# Patient Record
Sex: MALE | Race: WHITE | NOT HISPANIC OR LATINO | Employment: STUDENT | URBAN - METROPOLITAN AREA
[De-identification: names, ages, dates, MRNs, and addresses within clinical notes are randomized per-mention and may not be internally consistent; named-entity substitution may affect disease eponyms.]

---

## 2024-06-18 ENCOUNTER — OFFICE VISIT (OUTPATIENT)
Dept: FAMILY MEDICINE CLINIC | Facility: CLINIC | Age: 24
End: 2024-06-18
Payer: COMMERCIAL

## 2024-06-18 VITALS
WEIGHT: 124 LBS | HEIGHT: 68 IN | BODY MASS INDEX: 18.79 KG/M2 | SYSTOLIC BLOOD PRESSURE: 108 MMHG | HEART RATE: 104 BPM | OXYGEN SATURATION: 97 % | TEMPERATURE: 98.4 F | DIASTOLIC BLOOD PRESSURE: 82 MMHG | RESPIRATION RATE: 16 BRPM

## 2024-06-18 DIAGNOSIS — Z00.00 ENCOUNTER FOR MEDICAL EXAMINATION TO ESTABLISH CARE: Primary | ICD-10-CM

## 2024-06-18 DIAGNOSIS — F90.9 HYPERACTIVE: ICD-10-CM

## 2024-06-18 DIAGNOSIS — F41.9 ANXIETY: ICD-10-CM

## 2024-06-18 DIAGNOSIS — F32.A DEPRESSION, UNSPECIFIED DEPRESSION TYPE: ICD-10-CM

## 2024-06-18 DIAGNOSIS — Z63.0: ICD-10-CM

## 2024-06-18 PROCEDURE — 99204 OFFICE O/P NEW MOD 45 MIN: CPT | Performed by: STUDENT IN AN ORGANIZED HEALTH CARE EDUCATION/TRAINING PROGRAM

## 2024-06-18 RX ORDER — FLUOXETINE HYDROCHLORIDE 40 MG/1
1 CAPSULE ORAL DAILY
COMMUNITY
Start: 2024-03-27 | End: 2024-06-18 | Stop reason: SDUPTHER

## 2024-06-18 RX ORDER — FLUOXETINE HYDROCHLORIDE 40 MG/1
40 CAPSULE ORAL DAILY
Qty: 90 CAPSULE | Refills: 1 | Status: SHIPPED | OUTPATIENT
Start: 2024-06-18

## 2024-06-18 SDOH — SOCIAL STABILITY - SOCIAL INSECURITY: PROBLEMS IN RELATIONSHIP WITH SPOUSE OR PARTNER: Z63.0

## 2024-06-19 LAB
ALBUMIN SERPL-MCNC: 4.8 G/DL (ref 4.3–5.2)
ALP SERPL-CCNC: 68 IU/L (ref 44–121)
ALT SERPL-CCNC: 9 IU/L (ref 0–44)
AST SERPL-CCNC: 19 IU/L (ref 0–40)
BILIRUB SERPL-MCNC: 1.4 MG/DL (ref 0–1.2)
BUN SERPL-MCNC: 7 MG/DL (ref 6–20)
BUN/CREAT SERPL: 8 (ref 9–20)
CALCIUM SERPL-MCNC: 9.7 MG/DL (ref 8.7–10.2)
CHLORIDE SERPL-SCNC: 102 MMOL/L (ref 96–106)
CO2 SERPL-SCNC: 26 MMOL/L (ref 20–29)
CREAT SERPL-MCNC: 0.93 MG/DL (ref 0.76–1.27)
EGFRCR SERPLBLD CKD-EPI 2021: 118 ML/MIN/1.73
GLOBULIN SER CALC-MCNC: 2.4 G/DL (ref 1.5–4.5)
GLUCOSE SERPL-MCNC: 98 MG/DL (ref 70–99)
POTASSIUM SERPL-SCNC: 4.2 MMOL/L (ref 3.5–5.2)
PROT SERPL-MCNC: 7.2 G/DL (ref 6–8.5)
SODIUM SERPL-SCNC: 140 MMOL/L (ref 134–144)
TSH SERPL DL<=0.005 MIU/L-ACNC: 1.3 UIU/ML (ref 0.45–4.5)

## 2024-06-22 NOTE — PROGRESS NOTES
"Chief Complaint  Establish Care, Anxiety, Depression, and Med Refill (PROZAC 40 MG.)    Subjective        Iraj Mcdermott presents to [facility]  Anxiety  His past medical history is significant for depression.   Depression  His past medical history is significant for depression.     Establishing medical care  PMH anxiety and depression and is on prozac, would need refill today.  Also noticing inattentiveness and not able to finish his activities at home, currently staying at home looking for job. Would like to get evaluated for ADHD    Objective   Vital Signs:  /82 (BP Location: Left arm, Patient Position: Sitting, Cuff Size: Adult)   Pulse 104   Temp 98.4 °F (36.9 °C) (Oral)   Resp 16   Ht 172.7 cm (68\")   Wt 56.2 kg (124 lb)   SpO2 97%   BMI 18.85 kg/m²   Estimated body mass index is 18.85 kg/m² as calculated from the following:    Height as of this encounter: 172.7 cm (68\").    Weight as of this encounter: 56.2 kg (124 lb).       BMI is within normal parameters. No other follow-up for BMI required.      Physical Exam  Cardiovascular:      Rate and Rhythm: Normal rate.      Pulses: Normal pulses.      Heart sounds: Normal heart sounds.   Pulmonary:      Effort: Pulmonary effort is normal.      Breath sounds: Normal breath sounds.   Abdominal:      General: Bowel sounds are normal.      Palpations: Abdomen is soft.   Musculoskeletal:         General: Normal range of motion.   Skin:     General: Skin is warm.   Neurological:      Mental Status: He is alert and oriented to person, place, and time.        Result Review :    The following data was reviewed by: Gloria Callejas MD on 06/18/2024:  CMP          6/18/2024    15:34   CMP   Glucose 98    BUN 7    Creatinine 0.93    Sodium 140    Potassium 4.2    Chloride 102    Calcium 9.7    Total Protein 7.2    Albumin 4.8    Globulin 2.4    Total Bilirubin 1.4    Alkaline Phosphatase 68    AST (SGOT) 19    ALT (SGPT) 9  "   BUN/Creatinine Ratio 8      TSH          6/18/2024    15:34   TSH   TSH 1.300                   Assessment and Plan     Diagnoses and all orders for this visit:    1. Encounter for medical examination to establish care (Primary)    2. Hyperactive  -     NeuroPsych Testing    3. Has inattentive partner  -     NeuroPsych Testing    4. Depression, unspecified depression type  -     Comprehensive Metabolic Panel  -     TSH  -     FLUoxetine (PROzac) 40 MG capsule; Take 1 capsule by mouth Daily.  Dispense: 90 capsule; Refill: 1    5. Anxiety  -     Comprehensive Metabolic Panel  -     TSH  -     FLUoxetine (PROzac) 40 MG capsule; Take 1 capsule by mouth Daily.  Dispense: 90 capsule; Refill: 1    # for anxiety /depression  Prozac prescription refilled today  Pt will need neuropsych evaluation for possible ADHD  TSH CMP done WNL  RTC in 6 months for physical with labs         Follow Up     No follow-ups on file.  Patient was given instructions and counseling regarding his condition or for health maintenance advice. Please see specific information pulled into the AVS if appropriate.

## 2024-06-24 ENCOUNTER — TELEPHONE (OUTPATIENT)
Dept: FAMILY MEDICINE CLINIC | Facility: CLINIC | Age: 24
End: 2024-06-24
Payer: COMMERCIAL

## 2024-06-24 NOTE — TELEPHONE ENCOUNTER
PT SAID HE WAS TOLD TO CALL YOU BACK. [phone]    I DID NOT SEE ANY NOTES IN CHART FROM YOU REGARDING REASON.

## 2024-06-25 NOTE — TELEPHONE ENCOUNTER
CALLED PT BACK AND LEFT A VM FOR PT TO CALL ME. I CAN'T LEAVE A DETAILED MESSAGE ON HIS VM BECAUSE WE DID NOT GET A VERBAL RELEASE FORM SIGNED BY PT TO DO SO. I NEED PT TO COME BY OFFICE AND SIGN A VERBAL RELEASE AND I ALSO NEED PT'S INS CARD IN ORDER TO GET HIM SET UP FOR NEUROPSYCH TESTING.

## 2024-08-12 ENCOUNTER — OFFICE VISIT (OUTPATIENT)
Dept: FAMILY MEDICINE CLINIC | Facility: CLINIC | Age: 24
End: 2024-08-12
Payer: COMMERCIAL

## 2024-08-12 VITALS
OXYGEN SATURATION: 97 % | RESPIRATION RATE: 16 BRPM | WEIGHT: 124.4 LBS | HEART RATE: 64 BPM | SYSTOLIC BLOOD PRESSURE: 118 MMHG | DIASTOLIC BLOOD PRESSURE: 74 MMHG | HEIGHT: 68 IN | BODY MASS INDEX: 18.85 KG/M2 | TEMPERATURE: 98.5 F

## 2024-08-12 DIAGNOSIS — H66.001 NON-RECURRENT ACUTE SUPPURATIVE OTITIS MEDIA OF RIGHT EAR WITHOUT SPONTANEOUS RUPTURE OF TYMPANIC MEMBRANE: ICD-10-CM

## 2024-08-12 DIAGNOSIS — J02.9 ACUTE PHARYNGITIS, UNSPECIFIED ETIOLOGY: Primary | ICD-10-CM

## 2024-08-12 PROCEDURE — 99213 OFFICE O/P EST LOW 20 MIN: CPT | Performed by: STUDENT IN AN ORGANIZED HEALTH CARE EDUCATION/TRAINING PROGRAM

## 2024-08-12 RX ORDER — AMOXICILLIN 875 MG/1
875 TABLET, COATED ORAL 2 TIMES DAILY
Qty: 14 TABLET | Refills: 0 | Status: SHIPPED | OUTPATIENT
Start: 2024-08-12 | End: 2024-08-19 | Stop reason: SDUPTHER

## 2024-08-12 NOTE — PROGRESS NOTES
"Chief Complaint  Earache (RIGHT X 2 DAYS./HOME COVID TEST YESTERDAY, NEGATIVE.), Ear Fullness (PRESSURE. RIGHT X 2 DAYS.), Sore Throat (X 2 DAYS.), Headache (X LAST 2 WEEKS.), Nausea (X 2 WEEKS ON/OFF.), and Fever (X 2 DAYS AGO.)    Subjective        Iraj Mcdermott presents to [facility]  Earache   Associated symptoms include headaches and a sore throat.   Ear Fullness   Associated symptoms include headaches and a sore throat.   Sore Throat   Associated symptoms include ear pain, headaches and a plugged ear sensation.   Headache  Nausea  Associated symptoms include a fever, headaches, nausea and a sore throat.   Fever   Associated symptoms include ear pain, headaches, nausea and a sore throat.     For right earache for 2 days, throat pain more on the right side for 2 days, fever for 2 days.  Patient stated he is taking Tylenol every day.  Had symptoms such as lightheadedness a week ago  Objective   Vital Signs:  /74 (BP Location: Left arm, Patient Position: Sitting, Cuff Size: Adult)   Pulse 64   Temp 98.5 °F (36.9 °C) (Oral)   Resp 16   Ht 172.7 cm (68\")   Wt 56.4 kg (124 lb 6.4 oz)   SpO2 97%   BMI 18.91 kg/m²   Estimated body mass index is 18.91 kg/m² as calculated from the following:    Height as of this encounter: 172.7 cm (68\").    Weight as of this encounter: 56.4 kg (124 lb 6.4 oz).       BMI is within normal parameters. No other follow-up for BMI required.      Physical Exam  HENT:      Ears:      Comments: Right ear exam  EAC reddened swollen  Tympanic membrane redness present    Left ear exam normal     Mouth/Throat:      Pharynx: Oropharyngeal exudate and posterior oropharyngeal erythema present.   Cardiovascular:      Rate and Rhythm: Normal rate.      Pulses: Normal pulses.   Pulmonary:      Effort: Pulmonary effort is normal.      Breath sounds: Normal breath sounds.   Abdominal:      General: Bowel sounds are normal.      Palpations: Abdomen is soft. "   Neurological:      Mental Status: He is alert.        Result Review :                     Assessment and Plan     Diagnoses and all orders for this visit:    1. Acute pharyngitis, unspecified etiology (Primary)  -     amoxicillin (AMOXIL) 875 MG tablet; Take 1 tablet by mouth 2 (Two) Times a Day.  Dispense: 14 tablet; Refill: 0    2. Non-recurrent acute suppurative otitis media of right ear without spontaneous rupture of tympanic membrane  -     amoxicillin (AMOXIL) 875 MG tablet; Take 1 tablet by mouth 2 (Two) Times a Day.  Dispense: 14 tablet; Refill: 0    Advised patient to take amoxicillin for 7 days  Also encouraged to do saline warm gargles  RTC problem persist or worsen         Follow Up     No follow-ups on file.  Patient was given instructions and counseling regarding his condition or for health maintenance advice. Please see specific information pulled into the AVS if appropriate.

## 2024-08-19 ENCOUNTER — TELEPHONE (OUTPATIENT)
Dept: FAMILY MEDICINE CLINIC | Facility: CLINIC | Age: 24
End: 2024-08-19

## 2024-08-19 DIAGNOSIS — J01.00 ACUTE NON-RECURRENT MAXILLARY SINUSITIS: Primary | ICD-10-CM

## 2024-08-19 RX ORDER — AMOXICILLIN AND CLAVULANATE POTASSIUM 875; 125 MG/1; MG/1
1 TABLET, FILM COATED ORAL 2 TIMES DAILY
Qty: 10 TABLET | Refills: 0 | Status: SHIPPED | OUTPATIENT
Start: 2024-08-19 | End: 2024-08-22 | Stop reason: SDUPTHER

## 2024-08-21 ENCOUNTER — TELEPHONE (OUTPATIENT)
Dept: FAMILY MEDICINE CLINIC | Facility: CLINIC | Age: 24
End: 2024-08-21

## 2024-08-22 ENCOUNTER — OFFICE VISIT (OUTPATIENT)
Dept: FAMILY MEDICINE CLINIC | Facility: CLINIC | Age: 24
End: 2024-08-22
Payer: COMMERCIAL

## 2024-08-22 VITALS
TEMPERATURE: 98.3 F | RESPIRATION RATE: 16 BRPM | WEIGHT: 121.2 LBS | HEIGHT: 68 IN | HEART RATE: 92 BPM | DIASTOLIC BLOOD PRESSURE: 82 MMHG | OXYGEN SATURATION: 96 % | BODY MASS INDEX: 18.37 KG/M2 | SYSTOLIC BLOOD PRESSURE: 120 MMHG

## 2024-08-22 DIAGNOSIS — B96.89 ACUTE BACTERIAL TONSILLITIS: Primary | ICD-10-CM

## 2024-08-22 DIAGNOSIS — J01.00 ACUTE NON-RECURRENT MAXILLARY SINUSITIS: ICD-10-CM

## 2024-08-22 DIAGNOSIS — J03.80 ACUTE BACTERIAL TONSILLITIS: Primary | ICD-10-CM

## 2024-08-22 PROCEDURE — 99213 OFFICE O/P EST LOW 20 MIN: CPT | Performed by: STUDENT IN AN ORGANIZED HEALTH CARE EDUCATION/TRAINING PROGRAM

## 2024-08-22 RX ORDER — AMOXICILLIN AND CLAVULANATE POTASSIUM 875; 125 MG/1; MG/1
1 TABLET, FILM COATED ORAL 2 TIMES DAILY
Qty: 20 TABLET | Refills: 0 | Status: SHIPPED | OUTPATIENT
Start: 2024-08-22

## 2024-08-22 NOTE — PROGRESS NOTES
"Chief Complaint  Sore Throat (Ongoing./), Fever (Ongoing.), Headache, and Ear Fullness (RINGING.)    Subjective        Iraj Mcdermott presents to [facility]  Sore Throat   Associated symptoms include headaches and a plugged ear sensation.   Fever   Associated symptoms include headaches and a sore throat.   Headache  Ear Fullness   Associated symptoms include headaches and a sore throat.     For fever ( varyng 100-103) sore throat more on right side with enlarged tonsil, ear fullness more on right ear  since 8/10    Pt stated he has noticed improvement by 50% but not completely okay.    Objective   Vital Signs:  /82 (BP Location: Left arm, Patient Position: Sitting, Cuff Size: Adult)   Pulse 92   Temp 98.3 °F (36.8 °C) (Oral)   Resp 16   Ht 172.7 cm (68\")   Wt 55 kg (121 lb 3.2 oz)   SpO2 96%   BMI 18.43 kg/m²   Estimated body mass index is 18.43 kg/m² as calculated from the following:    Height as of this encounter: 172.7 cm (68\").    Weight as of this encounter: 55 kg (121 lb 3.2 oz).          Physical Exam  HENT:      Right Ear: Ear canal and external ear normal.      Left Ear: Ear canal and external ear normal.      Ears:      Comments: Slight opaqueness noticed b/l     Mouth/Throat:      Pharynx: Oropharyngeal exudate and posterior oropharyngeal erythema present.      Comments: Tonsils enlarged, right tonsils has exudates  Cardiovascular:      Rate and Rhythm: Normal rate.      Pulses: Normal pulses.      Heart sounds: Normal heart sounds.   Pulmonary:      Effort: Pulmonary effort is normal.      Breath sounds: Normal breath sounds.   Abdominal:      General: Bowel sounds are normal.      Palpations: Abdomen is soft.   Skin:     General: Skin is warm.   Neurological:      Mental Status: He is alert and oriented to person, place, and time.        Result Review :                Assessment and Plan   Diagnoses and all orders for this visit:    1. Acute bacterial " tonsillitis (Primary)  -     Throat / Upper Respiratory Culture - Swab, Throat  -     Ambulatory Referral to ENT (Otolaryngology)    2. Acute non-recurrent maxillary sinusitis  -     amoxicillin-clavulanate (AUGMENTIN) 875-125 MG per tablet; Take 1 tablet by mouth 2 (Two) Times a Day.  Dispense: 20 tablet; Refill: 0    # advise to take augmentin for 10 more days  Also send throat culture  Ent referral given in case if symptoms are not improving with antibiotics.         Follow Up   No follow-ups on file.  Patient was given instructions and counseling regarding his condition or for health maintenance advice. Please see specific information pulled into the AVS if appropriate.

## 2024-08-26 ENCOUNTER — TELEPHONE (OUTPATIENT)
Dept: FAMILY MEDICINE CLINIC | Facility: CLINIC | Age: 24
End: 2024-08-26
Payer: COMMERCIAL

## 2024-08-26 LAB
Lab: NORMAL
S PYO THROAT QL CULT: NEGATIVE

## 2024-08-26 NOTE — TELEPHONE ENCOUNTER
----- Message from Gloria Callejas sent at 8/26/2024  8:56 AM EDT -----  Please inform patient his throat culture came out negative for any Group A strep bacteria. Thank you